# Patient Record
Sex: MALE | Race: BLACK OR AFRICAN AMERICAN | Employment: UNEMPLOYED | URBAN - METROPOLITAN AREA
[De-identification: names, ages, dates, MRNs, and addresses within clinical notes are randomized per-mention and may not be internally consistent; named-entity substitution may affect disease eponyms.]

---

## 2017-01-03 ENCOUNTER — TRANSFERRED RECORDS (OUTPATIENT)
Dept: HEALTH INFORMATION MANAGEMENT | Facility: CLINIC | Age: 57
End: 2017-01-03

## 2017-01-03 ENCOUNTER — APPOINTMENT (OUTPATIENT)
Dept: ULTRASOUND IMAGING | Facility: CLINIC | Age: 57
End: 2017-01-03
Attending: EMERGENCY MEDICINE
Payer: MEDICARE

## 2017-01-03 ENCOUNTER — HOSPITAL ENCOUNTER (EMERGENCY)
Facility: CLINIC | Age: 57
Discharge: HOME | End: 2017-01-04
Attending: EMERGENCY MEDICINE | Admitting: EMERGENCY MEDICINE
Payer: MEDICARE

## 2017-01-03 VITALS
RESPIRATION RATE: 18 BRPM | TEMPERATURE: 97.6 F | DIASTOLIC BLOOD PRESSURE: 71 MMHG | SYSTOLIC BLOOD PRESSURE: 129 MMHG | HEART RATE: 84 BPM | OXYGEN SATURATION: 99 %

## 2017-01-03 DIAGNOSIS — E87.5 HYPERKALEMIA: ICD-10-CM

## 2017-01-03 DIAGNOSIS — I77.0 A-V FISTULA (H): ICD-10-CM

## 2017-01-03 LAB
ANION GAP SERPL CALCULATED.3IONS-SCNC: 11 MMOL/L (ref 3–14)
BASOPHILS # BLD AUTO: 0 10E9/L (ref 0–0.2)
BASOPHILS NFR BLD AUTO: 0.3 %
BUN SERPL-MCNC: 57 MG/DL (ref 7–30)
CALCIUM SERPL-MCNC: 9.5 MG/DL (ref 8.5–10.1)
CHLORIDE SERPL-SCNC: 95 MMOL/L (ref 94–109)
CO2 SERPL-SCNC: 26 MMOL/L (ref 20–32)
CREAT SERPL-MCNC: 11 MG/DL (ref 0.66–1.25)
DIFFERENTIAL METHOD BLD: ABNORMAL
EOSINOPHIL # BLD AUTO: 0.5 10E9/L (ref 0–0.7)
EOSINOPHIL NFR BLD AUTO: 9 %
ERYTHROCYTE [DISTWIDTH] IN BLOOD BY AUTOMATED COUNT: 16.7 % (ref 10–15)
GFR SERPL CREATININE-BSD FRML MDRD: 5 ML/MIN/1.7M2
GLUCOSE SERPL-MCNC: 71 MG/DL (ref 70–99)
HCT VFR BLD AUTO: 39.2 % (ref 40–53)
HGB BLD-MCNC: 12.6 G/DL (ref 13.3–17.7)
IMM GRANULOCYTES # BLD: 0 10E9/L (ref 0–0.4)
IMM GRANULOCYTES NFR BLD: 0.3 %
LYMPHOCYTES # BLD AUTO: 0.9 10E9/L (ref 0.8–5.3)
LYMPHOCYTES NFR BLD AUTO: 15.9 %
MCH RBC QN AUTO: 30.7 PG (ref 26.5–33)
MCHC RBC AUTO-ENTMCNC: 32.1 G/DL (ref 31.5–36.5)
MCV RBC AUTO: 96 FL (ref 78–100)
MONOCYTES # BLD AUTO: 1 10E9/L (ref 0–1.3)
MONOCYTES NFR BLD AUTO: 17.8 %
NEUTROPHILS # BLD AUTO: 3.3 10E9/L (ref 1.6–8.3)
NEUTROPHILS NFR BLD AUTO: 56.7 %
NRBC # BLD AUTO: 0 10*3/UL
NRBC BLD AUTO-RTO: 0 /100
PLATELET # BLD AUTO: 175 10E9/L (ref 150–450)
POTASSIUM SERPL-SCNC: 5.7 MMOL/L (ref 3.4–5.3)
RBC # BLD AUTO: 4.1 10E12/L (ref 4.4–5.9)
SODIUM SERPL-SCNC: 132 MMOL/L (ref 133–144)
WBC # BLD AUTO: 5.8 10E9/L (ref 4–11)

## 2017-01-03 PROCEDURE — 99285 EMERGENCY DEPT VISIT HI MDM: CPT | Mod: 25 | Performed by: EMERGENCY MEDICINE

## 2017-01-03 PROCEDURE — 93010 ELECTROCARDIOGRAM REPORT: CPT | Mod: Z6 | Performed by: EMERGENCY MEDICINE

## 2017-01-03 PROCEDURE — 36415 COLL VENOUS BLD VENIPUNCTURE: CPT | Performed by: EMERGENCY MEDICINE

## 2017-01-03 PROCEDURE — 85025 COMPLETE CBC W/AUTO DIFF WBC: CPT | Performed by: EMERGENCY MEDICINE

## 2017-01-03 PROCEDURE — 99284 EMERGENCY DEPT VISIT MOD MDM: CPT | Mod: 25 | Performed by: EMERGENCY MEDICINE

## 2017-01-03 PROCEDURE — 93990 DOPPLER FLOW TESTING: CPT

## 2017-01-03 PROCEDURE — 80048 BASIC METABOLIC PNL TOTAL CA: CPT | Performed by: EMERGENCY MEDICINE

## 2017-01-03 PROCEDURE — 93005 ELECTROCARDIOGRAM TRACING: CPT | Performed by: EMERGENCY MEDICINE

## 2017-01-03 NOTE — ED AVS SNAPSHOT
[facility], [place], Emergency Department    [address] [place] [id number]    Phone:  [phone]                                       Manny Rodriguez   MRN: [id number]    Department:  [facility], Emergency Department   Date of Visit:  1/3/2017           After Visit Summary Signature Page     I have received my discharge instructions, and my questions have been answered. I have discussed any challenges I see with this plan with the nurse or doctor.    ..........................................................................................................................................  Patient/Patient Representative Signature      ..........................................................................................................................................  Patient Representative Print Name and Relationship to Patient    ..................................................               ................................................  Date                                            Time    ..........................................................................................................................................  Reviewed by Signature/Title    ...................................................              ..............................................  Date                                                            Time

## 2017-01-03 NOTE — ED NOTES
Pt arrived in the ED via [facility] from dialysis with reports of a non-functioning fistula.  Pt did not receive treatment today; EMS states his fistula has needed replacement for a while, but pt has not done anything about it.  Pt is awake, alert, and oriented x4; pt is ambulatory with a cane.

## 2017-01-03 NOTE — ED AVS SNAPSHOT
[facility], Emergency Department    [address] [address]    Phone:  [phone]                                       aMnny Rodriguez   MRN: [id number]    Department:  [facility], Emergency Department   Date of Visit:  1/3/2017           Patient Information     Date Of Birth          1960        Your diagnoses for this visit were:     A-V fistula (H)     Hyperkalemia        You were seen by Marcell Francois MD.      Follow-up Information     Follow up with [facility], Emergency Department.    Specialty:  EMERGENCY MEDICINE    Why:  If symptoms worsen    Contact information:    [address] [facility] [facility] [id number] [phone]    Additional information:    The [facility] is located on the corner of [facility] and [facility] on the [facility]. It is easily accessible from virtually any point in the [facility] area, via [employer] and [employer].        Discharge Instructions       Please make an appointment to follow up with your Kidney doctor as soon as possible.    Controlling Your Potassium Intake  For Patients with Kidney Disease  Potassium  Potassium is a mineral that helps your heart and muscles work properly. It is found in your body and in the foods you eat.  Healthy kidneys control the amount of potassium in your body. They remove any extra potassium that is not needed. If you have kidney disease, this extra potassium can build up in your blood, causing an irregular heartbeat (your heart does not beat the way it should).  You can limit the amount of potassium you eat to prevent stress to your heart and muscles.  Most doctors and dietitians will recommend that you try to eat less than 2000 to 3000 mg potassium a day. To stay in this range, you may eat one serving of high-potassium food. It may be better to choose low-potassium foods instead. (See the following charts.)  Salt substitutes  Salt substitutes are sold in the grocery  store. In these products, the salt (sodium) is often replaced with potassium salt.  You should avoid all salt substitutes unless your doctor says they are safe for you to eat. Examples include Portillo Salt Substitute, No Salt, Salt Substitute, Cardia and Lite Salt.  High-potassium foods (over 200 mg per serving)--limit to one serving per day  Unless listed differently below, one serving is the same as a 1/2 cup, or one medium piece.  Vegetables    Artichoke    Bamboo shoots    Beets, cooked    Bok hamzah    Broccoli, cooked    [place] sprouts (1 cup)    Carrots, raw    Mushrooms, canned    Potato, sweet potato or yam    Potato chips or french fries (10)    Squash or pumpkin    Spinach and other greens, cooked    Tomatoes and tomato products    Vegetable juice (1 cup or 8 ounces)  Fruits    Apricots, (2 medium fresh or 5 1/2 dried)    Avocado    Banana    Cantaloupe or honeydew (1 cup cubed)    Dates and figs (5 whole)    Dried fruit    Grapefruit juice    Kiwi (1 large)    Chirag    Nectarine, fresh    Orange, orange juice    Papaya ( 1/2 of a whole)    Pomegranate juice    Prunes or prune juice    Raisins, seedless  Low-potassium foods  Eating more than one serving can make a low-potassium food into a high-potassium food. Unless listed differently below, one serving is the same as a 1/2 cup, or one medium piece.  Fruits    Apricot, canned ( 1/2 cup, drained)    Apple (medium), applesauce, apple juice    Blueberries, fresh    Cherries, fresh    Cranberries, fresh    Fruit cocktail, canned (1 cup, drained)    Grapefruit    Grapes, grape juice    Mandarin orange    Peach (1 medium fresh or 1 cup halves, drained)    Pear (1 medium fresh or 1 cup halves, drained)    Plums    Pineapple (canned) or pineapple juice    Raspberries, fresh    Rhubarb    Strawberries, fresh    Tangerine    Watermelon (1 cup)  Vegetables    Asparagus (6 mahan)    Beets (canned or pickled)    Broccoli, raw    Cabbage    Carrot,  cooked    Cauliflower    Celery (1 stalk)    Corn, fresh    Cucumber    Eggplant    Green beans or waxed beans    Lettuce, iceberg    Mixed vegetables    Mushrooms, fresh    Onion    Peas    Peppers    Spinach, raw ( 1/2 cup)    Zucchini  For informational purposes only. Not to replace the advice of your health care provider.  Copyright   2004 [facility]. All rights reserved. BackTrack [id number] - REV 12/15.      24 Hour Appointment Hotline       To make an appointment at any [facility] clinic, call [phone] ([phone]). If you don't have a family doctor or clinic, we will help you find one. [facility] clinics are conveniently located to serve the needs of you and your family.             Review of your medicines      Our records show that you are taking the medicines listed below. If these are incorrect, please call your family doctor or clinic.        Dose / Directions Last dose taken    Calcium Acetate 667 MG Tabs   Dose:  2668 mg        Take 2,668 mg by mouth 3 times daily (with meals)   Refills:  0        CINACALCET HCL PO   Dose:  30 mg        Take 30 mg by mouth 2 times daily   Refills:  0        diphenhydrAMINE 25 MG capsule   Commonly known as:  BENADRYL   Dose:  25 mg        Take 25 mg by mouth   Refills:  0        HYDROmorphone 2 MG tablet   Commonly known as:  DILAUDID   Dose:  1 mg   Quantity:  16 tablet        Take 0.5 tablets (1 mg) by mouth every 4 hours as needed for moderate to severe pain   Refills:  0        NITROSTAT SL   Dose:  0.4 mg        Place 0.4 mg under the tongue every 5 minutes as needed for chest pain   Refills:  0        RENAL 1 MG Caps   Dose:  1 mg        Take 1 mg by mouth 2 times daily   Refills:  0        sennosides 8.6 MG tablet   Commonly known as:  SENOKOT   Dose:  2 tablet        Take 2 tablets by mouth daily as needed for constipation   Refills:  0                Procedures and tests performed during your visit     Basic metabolic panel    CBC with  "platelets differential    EKG 12-lead, tracing only    US Ext Arterial Venous Dialys Acs Graft      Orders Needing Specimen Collection     None      Pending Results     Date and Time Order Name Status Description    1/3/2017 2043 US Ext Arterial Venous Dialys Acs Graft Preliminary     1/3/2017 2042 EKG 12-lead, tracing only Preliminary             Pending Culture Results     No orders found for last 2 day(s).            Thank you for choosing [facility]       Thank you for choosing [facility] for your care. Our goal is always to provide you with excellent care. Hearing back from our patients is one way we can continue to improve our services. Please take a few minutes to complete the written survey that you may receive in the mail after you visit with us. Thank you!        [employer] Information     [employer] lets you send messages to your doctor, view your test results, renew your prescriptions, schedule appointments and more. To sign up, go to [email or web address] . Click on \"Log in\" on the left side of the screen, which will take you to the Welcome page. Then click on \"Sign up Now\" on the right side of the page.     You will be asked to enter the access code listed below, as well as some personal information. Please follow the directions to create your username and password.     Your access code is: [email or web address]  Expires: 2017 12:16 AM     Your access code will  in 90 days. If you need help or a new code, please call your [facility] clinic or [phone].        Care EveryWhere ID     This is your Care EveryWhere ID. This could be used by other organizations to access your [facility] medical records  [id number]        After Visit Summary       This is your record. Keep this with you and show to your community pharmacist(s) and doctor(s) at your next visit.                  "

## 2017-01-04 LAB — INTERPRETATION ECG - MUSE: NORMAL

## 2017-01-04 NOTE — ED PROVIDER NOTES
History     Chief Complaint   Patient presents with     Vascular Access Problem     HPI  Manny Rodriguez is a 56 year old male with a history of ESRD on hemodialysis TTHSAT, hypertension, heart murmur, CAD, pancreatitis, and aortic regurgitation who presents via ambulance from his dialysis clinic for further evaluation of a vascular access problem. The patient reports he was at his dialysis clinic today, but staff at the clinic were unable to access his fistula. Currently, he complains of left upper extremity pain at the site of his fistula, that initially began three weeks ago. He reports his pain is worst following dialysis runs, to the point where he is unable to lift his arm. He reports chronic shortness of breath. He denies nausea or vomiting. No recent fever or chills. He is unsure what his dry weight is supposed to be.     I have reviewed the Medications, Allergies, Past Medical and Surgical History, and Social History in the [employer] system.  Past Medical History   Diagnosis Date     Neuromuscular disorder (H)      Hypertension      Heart murmur      Thrombosis of leg      Coronary artery disease involving native coronary artery of native heart without angina pectoris 12/29/2015     Echo 2010, nl LVFx     ESRD (end stage renal disease) on dialysis (H) 12/29/2015     ESRD on Dialysis since 2001, Renal failure secondary to Indocin.     Chronic pain- opiate use 12/29/2015     Peripheral neuropathy (H) 12/29/2015     Depression 12/29/2015     History of pulmonary embolism 12/29/2015     Pancreatitis      Aortic stenosis      bicuspid aortic valve     Aortic regurgitation      Acute colitis      PONV (postoperative nausea and vomiting)      Red blood cell antibody positive      History of tracheostomy      Dialysis patient (H)      Mon-Wed-Fri       Past Surgical History   Procedure Laterality Date     Cardiac surgery       3 cardiac stents     Angiogram Bilateral 11/9/2015     Procedure: ANGIOGRAM;  Surgeon:  Mary Mayo MD;  Location: [facility] OR     Lower leg vascular stents, vascular center at [facility].       Right second toe amputation       due to infection     Knee surgery Left      Transplant kidney recipient  donor       [facility], failed     Multiple av fistulas/grafts       Endovascular placement vascular device Left 3/11/2016     Procedure: ENDOVASCULAR PLACEMENT VASCULAR DEVICE;  Surgeon: Mary Mayo MD;  Location: [facility] OR       Family History   Problem Relation Age of Onset     DIABETES Mother      Suicide Mother        Social History   Substance Use Topics     Smoking status: Current Every Day Smoker -- 16 years     Last Attempt to Quit: 2013     Smokeless tobacco: Not on file      Comment: quit 2 years ago     Alcohol Use: No     No current facility-administered medications for this encounter.     Current Outpatient Prescriptions   Medication     HYDROmorphone (DILAUDID) 2 MG tablet     diphenhydrAMINE (BENADRYL) 25 MG capsule     B Complex-C-Folic Acid (RENAL) 1 MG CAPS     Calcium Acetate 667 MG TABS     CINACALCET HCL PO     Nitroglycerin (NITROSTAT SL)     sennosides (SENOKOT) 8.6 MG tablet        Allergies   Allergen Reactions     Bee Anaphylaxis     Bee Venom Anaphylaxis     Blood Transfusion Related (Informational Only) Other (See Comments)     Patient has a history of a clinically significant antibody against RBC antigens.  A delay in compatible RBCs may occur.     Duloxetine      Other reaction(s): Hallucinations     Cymbalta      Other reaction(s): Hallucinations     Gabapentin      Hallucinations     Lyrica      Hallucinations       Review of Systems  A complete 10 point ROS was obtained and negative except as noted in the HPI.    Physical Exam   BP: 113/54 mmHg  Pulse: 84  Temp: 97.6  F (36.4  C)  Resp: 18  SpO2: 94 %  Physical Exam  Gen: NAD, sitting on stretcher, conversant and pleasant, non-toxic appearing  HEENT: NCAT, PERRL, EOMI, MMM  Neck: trachea  midline, supple with FROM  Cardio: normal rate regular rhythm, no R/M/G, normally perfused and warm  Pulm: steady, non-labored respirations, normal WOB, CTAB, no w/r/r  Abd: soft nt/nd, no organomegaly, no CVA tenderness  Ext: LUE fistula with good thrill, TTP but no signs of infection. Neurovascularly intact distally.   Skin: no rashes or signs of trauma  Neuro: no focal deficits, 5/5 strength in all ext    ED Course   Procedures       8:34 PM  The patient was seen and examined by Dr. Francois in Room 19.          EKG Interpretation:      Interpreted by Marcell Francois    Symptoms at time of EKG: none   Rhythm: normal sinus   Rate: normal  Axis: normal  Ectopy: none  Conduction: normal  ST Segments/ T Waves: No ST-T wave changes  Q Waves: none  Comparison to prior: Unchanged    Clinical Impression: normal EKG           Labs Ordered and Resulted from Time of ED Arrival Up to the Time of Departure from the ED   CBC WITH PLATELETS DIFFERENTIAL - Abnormal; Notable for the following:     RBC Count 4.10 (*)     Hemoglobin 12.6 (*)     Hematocrit 39.2 (*)     RDW 16.7 (*)     All other components within normal limits   BASIC METABOLIC PANEL - Abnormal; Notable for the following:     Sodium 132 (*)     Potassium 5.7 (*)     Urea Nitrogen 57 (*)     Creatinine 11.00 (*)     GFR Estimate 5 (*)     GFR Estimate If Black 6 (*)     All other components within normal limits       Assessments & Plan (with Medical Decision Making)   56 year old ESRD patient who dialyzes TTHSAT at [facility] who was sent in from dialysis because of a fistula not working properly. Patient unfortunately was unable to dialyze today. On exam the patient has a fistula in the left upper extremity with a HERO device that has a good palpable thrill and normal radial pulse distally. I obtained a basic chemistry panel which was unremarkable with a minimally elevated potassium at 5.7. Ultrasound of the fistula revealed a patent fistula. I spoke with our  nephrologist on call who said that they would not dialyze emergently given that the patient had no EKG changes, a minimally elevated potassium, and no evidence of pulmonary edema on clinical exam. I was in agreement with this conclusion and we encouraged the patient to follow up with his nephrologist tomorrow and try to arrange for outpatient dialysis at the next earliest convenience. Patient was discharged to home at this time in good condition with normal vital signs and hemodynamically.       I have reviewed the nursing notes.    I have reviewed the findings, diagnosis, plan and need for follow up with the patient.    Discharge Medication List as of 1/4/2017 12:16 AM          Final diagnoses:   A-V fistula (H)   Hyperkalemia   I, Kelly Dc, am serving as a trained medical scribe to document services personally performed by Marcell Francois MD, based on the provider's statements to me.   Marcell SMITH MD, was physically present and have reviewed and verified the accuracy of this note documented by Kelly Dc.      1/3/2017   [facility], [place], EMERGENCY DEPARTMENT      Marcell Francois MD  01/04/17 0109

## 2017-01-04 NOTE — DISCHARGE INSTRUCTIONS
Please make an appointment to follow up with your Kidney doctor as soon as possible.    Controlling Your Potassium Intake  For Patients with Kidney Disease  Potassium  Potassium is a mineral that helps your heart and muscles work properly. It is found in your body and in the foods you eat.  Healthy kidneys control the amount of potassium in your body. They remove any extra potassium that is not needed. If you have kidney disease, this extra potassium can build up in your blood, causing an irregular heartbeat (your heart does not beat the way it should).  You can limit the amount of potassium you eat to prevent stress to your heart and muscles.  Most doctors and dietitians will recommend that you try to eat less than 2000 to 3000 mg potassium a day. To stay in this range, you may eat one serving of high-potassium food. It may be better to choose low-potassium foods instead. (See the following charts.)  Salt substitutes  Salt substitutes are sold in the grocery store. In these products, the salt (sodium) is often replaced with potassium salt.  You should avoid all salt substitutes unless your doctor says they are safe for you to eat. Examples include Portillo Salt Substitute, No Salt, Salt Substitute, Cardia and Lite Salt.  High-potassium foods (over 200 mg per serving)--limit to one serving per day  Unless listed differently below, one serving is the same as a 1/2 cup, or one medium piece.  Vegetables    Artichoke    Bamboo shoots    Beets, cooked    Bok hamzah    Broccoli, cooked    [place] sprouts (1 cup)    Carrots, raw    Mushrooms, canned    Potato, sweet potato or yam    Potato chips or french fries (10)    Squash or pumpkin    Spinach and other greens, cooked    Tomatoes and tomato products    Vegetable juice (1 cup or 8 ounces)  Fruits    Apricots, (2 medium fresh or 5 1/2 dried)    Avocado    Banana    Cantaloupe or honeydew (1 cup cubed)    Dates and figs (5 whole)    Dried fruit    Grapefruit juice    Kiwi (1  large)    [place]    Nectarine, fresh    Orange, orange juice    Papaya ( 1/2 of a whole)    Pomegranate juice    Prunes or prune juice    Raisins, seedless  Low-potassium foods  Eating more than one serving can make a low-potassium food into a high-potassium food. Unless listed differently below, one serving is the same as a 1/2 cup, or one medium piece.  Fruits    Apricot, canned ( 1/2 cup, drained)    Apple (medium), applesauce, apple juice    Blueberries, fresh    Cherries, fresh    Cranberries, fresh    Fruit cocktail, canned (1 cup, drained)    Grapefruit    Grapes, grape juice    Mandarin orange    Peach (1 medium fresh or 1 cup halves, drained)    Pear (1 medium fresh or 1 cup halves, drained)    Plums    Pineapple (canned) or pineapple juice    Raspberries, fresh    Rhubarb    Strawberries, fresh    Tangerine    Watermelon (1 cup)  Vegetables    Asparagus (6 mahan)    Beets (canned or pickled)    Broccoli, raw    Cabbage    Carrot, cooked    Cauliflower    Celery (1 stalk)    Corn, fresh    Cucumber    Eggplant    Green beans or waxed beans    Lettuce, iceberg    Mixed vegetables    Mushrooms, fresh    Onion    Peas    Peppers    Spinach, raw ( 1/2 cup)    Zucchini  For informational purposes only. Not to replace the advice of your health care provider.  Copyright   2004 [facility]. All rights reserved. [employer]csy [id number] - REV 12/15.

## 2017-01-05 ENCOUNTER — CARE COORDINATION (OUTPATIENT)
Dept: VASCULAR SURGERY | Facility: CLINIC | Age: 57
End: 2017-01-05

## 2017-01-05 NOTE — PROGRESS NOTES
Spoke with Farzaneh PALMA at [facility] regarding pts HD access.  Hero graft seems to have decreased function with difficulty at HD.  Patient reported to ED on 1/3 via ambulance because center was unable to do HD run.  Patient is there this evening on dialysis.    Dr Poe his nephrologist, lizbeth Bryant evaluated for leg access asap.  Will coordinate appointment along with necessary US on Tuesday 1/10/16.  Will notify Manny and HD center on exaxt time.